# Patient Record
Sex: MALE | Race: WHITE | Employment: FULL TIME | URBAN - METROPOLITAN AREA
[De-identification: names, ages, dates, MRNs, and addresses within clinical notes are randomized per-mention and may not be internally consistent; named-entity substitution may affect disease eponyms.]

---

## 2022-03-26 ENCOUNTER — HOSPITAL ENCOUNTER (EMERGENCY)
Age: 37
Discharge: HOME | End: 2022-03-26
Attending: EMERGENCY MEDICINE
Payer: COMMERCIAL

## 2022-03-26 VITALS
OXYGEN SATURATION: 98 % | RESPIRATION RATE: 18 BRPM | WEIGHT: 174 LBS | HEART RATE: 85 BPM | TEMPERATURE: 97.5 F | DIASTOLIC BLOOD PRESSURE: 86 MMHG | SYSTOLIC BLOOD PRESSURE: 132 MMHG | BODY MASS INDEX: 25.77 KG/M2 | HEIGHT: 69 IN

## 2022-03-26 DIAGNOSIS — R04.0 EPISTAXIS: Primary | ICD-10-CM

## 2022-03-26 PROCEDURE — 6370000000 HC RX 637 (ALT 250 FOR IP): Performed by: STUDENT IN AN ORGANIZED HEALTH CARE EDUCATION/TRAINING PROGRAM

## 2022-03-26 PROCEDURE — 99283 EMERGENCY DEPT VISIT LOW MDM: CPT

## 2022-03-26 RX ORDER — DEXTROAMPHETAMINE SACCHARATE, AMPHETAMINE ASPARTATE, DEXTROAMPHETAMINE SULFATE AND AMPHETAMINE SULFATE 5; 5; 5; 5 MG/1; MG/1; MG/1; MG/1
40 TABLET ORAL
COMMUNITY

## 2022-03-26 RX ADMIN — SALINE NASAL SPRAY 1 SPRAY: 1.5 SOLUTION NASAL at 11:49

## 2022-03-26 ASSESSMENT — ENCOUNTER SYMPTOMS
EYES NEGATIVE: 1
SORE THROAT: 0
NAUSEA: 1
ABDOMINAL PAIN: 1
RHINORRHEA: 0
ALLERGIC/IMMUNOLOGIC NEGATIVE: 1
RESPIRATORY NEGATIVE: 1

## 2022-03-26 NOTE — ED PROVIDER NOTES
ED Attending Attestation Note     Date of evaluation: 3/26/2022    This patient was seen by the resident. I have seen and examined the patient, agree with the workup, evaluation, management and diagnosis. The care plan has been discussed. My assessment reveals a nontoxic-appearing adult man. No increased work of breathing. No active epistaxis.      Neetu Christy MD  03/26/22 [id number]

## 2022-03-26 NOTE — ED PROVIDER NOTES
[id number] RESIDENT NOTE       Date of evaluation: 3/26/2022    Chief Complaint     Epistaxis (5 nose bleeds in past 2 days, not bleeding at this time )      History of Present Illness     Deanna Spear is a 43 y.o. male with no significant past medical history who presents to the emergency department with a chief complaint of frequent nosebleeds. Patient states that for the last 2 days, he has had 5 nosebleeds from his right nare. They resolved spontaneously on their own with pressure or insertion of tissue into the nare. He reports that he has recently been sick with what he believes to be a GI virus that most of his family has. He denies significant upper respiratory symptoms but states that he does have seasonal allergies and sounds somewhat congested. He does not have a primary care doctor and presents today due to frequency of the nosebleeds for evaluation and advice on how to proceed. He denies feeling dizzy, lightheaded, short of breath, syncope or presyncope. Had recent GI symptoms of nausea, vomiting, diarrhea but no fever, chills. Review of Systems     Review of Systems   Constitutional: Negative for fever. HENT: Positive for nosebleeds. Negative for postnasal drip, rhinorrhea and sore throat. Eyes: Negative. Respiratory: Negative. Cardiovascular: Negative. Gastrointestinal: Positive for abdominal pain (GI sx recent but resolved) and nausea. Endocrine: Negative. Genitourinary: Negative. Musculoskeletal: Negative. Skin: Negative. Allergic/Immunologic: Negative. Neurological: Negative. Hematological: Negative. Psychiatric/Behavioral: Negative. Past Medical, Surgical, Family, and Social History     He has no past medical history on file. He has no past surgical history on file. His family history is not on file. He reports that he has been smoking.  He has been smoking about 0.50 packs per day. He has never used smokeless tobacco. He reports current alcohol use. He reports that he does not use drugs. Medications     Previous Medications    AMPHETAMINE-DEXTROAMPHETAMINE (ADDERALL) 20 MG TABLET    Take 40 mg by mouth. Allergies     He has No Known Allergies. Physical Exam     INITIAL VITALS: BP: 132/86, Temp: 97.5 °F (36.4 °C), Pulse: 85, Resp: 18, SpO2: 98 %   Physical Exam  Vitals reviewed. Constitutional:       General: He is not in acute distress. Appearance: Normal appearance. He is normal weight. He is not ill-appearing. HENT:      Head: Normocephalic and atraumatic. Right Ear: External ear normal.      Left Ear: External ear normal.      Nose: Nose normal. No congestion or rhinorrhea. Comments: Bilateral nares are notably dry, one spot of dried blood in right nare, small. No active bleeding. Mouth/Throat:      Mouth: Mucous membranes are moist.      Pharynx: Oropharynx is clear. No oropharyngeal exudate. Comments: No blood in posterior oropharynx. Eyes:      Extraocular Movements: Extraocular movements intact. Conjunctiva/sclera: Conjunctivae normal.   Cardiovascular:      Rate and Rhythm: Normal rate. Pulses: Normal pulses. Pulmonary:      Effort: Pulmonary effort is normal.   Abdominal:      General: Abdomen is flat. There is no distension. Musculoskeletal:         General: No swelling or deformity. Normal range of motion. Cervical back: Normal range of motion and neck supple. Skin:     General: Skin is warm and dry. Capillary Refill: Capillary refill takes less than 2 seconds. Neurological:      General: No focal deficit present. Mental Status: He is alert and oriented to person, place, and time.       Gait: Gait normal.   Psychiatric:         Mood and Affect: Mood normal.         Behavior: Behavior normal.         DiagnosticResults     EKG   None    RADIOLOGY:  No orders to display     LABS:   No results found for this visit on 03/26/22. ED BEDSIDE ULTRASOUND:  None    RECENT VITALS:  BP: 132/86, Temp: 97.5 °F (36.4 °C), Pulse: 85,Resp: 18, SpO2: 98 %     Procedures     None    ED Course     Nursing Notes, Past Medical Hx, Past Surgical Hx, Social Hx, Allergies, and Family Hx were reviewed. The patient was given the followingmedications:  Orders Placed This Encounter   Medications    sodium chloride (OCEAN, BABY AYR) 0.65 % nasal spray 1 spray       CONSULTS:  None    MEDICAL DECISION MAKING / ASSESSMENT / Buckandreina Ritchie is a 43 y.o. male with no significant past medical history presents to the emergency department for several recent, self-limiting nosebleeds in the context of allergies and recent viral illness. Patient with significantly dry naris on examination with 1 small area of clot in the right anterior nare. Suspect irritation, seasonal allergies causing anterior epistaxis that is self resolving. Instructed patient on conservative measures and importance of follow-up with a primary care physician if needed if symptoms persist.  Strict return precautions discussed. Patient given Ocean Spray nasal spray for hydration of mucous membranes. Patient expresses understanding is in agreement with plan. This patient was also evaluated by the attending physician. All care plans were discussed and agreed upon. Clinical Impression     1. Epistaxis        Disposition     PATIENT REFERRED TO:  No follow-up provider specified.     DISCHARGE MEDICATIONS:  New Prescriptions    No medications on file     DISPOSITION Decision To Discharge 03/26/2022 11:05:36 AM       Liz Root MD  Resident  03/26/22 [id number]